# Patient Record
Sex: MALE | Race: ASIAN | Employment: FULL TIME | URBAN - METROPOLITAN AREA
[De-identification: names, ages, dates, MRNs, and addresses within clinical notes are randomized per-mention and may not be internally consistent; named-entity substitution may affect disease eponyms.]

---

## 2017-01-06 ENCOUNTER — OFFICE VISIT (OUTPATIENT)
Dept: FAMILY MEDICINE CLINIC | Facility: CLINIC | Age: 42
End: 2017-01-06

## 2017-01-06 VITALS
OXYGEN SATURATION: 98 % | BODY MASS INDEX: 23.08 KG/M2 | SYSTOLIC BLOOD PRESSURE: 122 MMHG | DIASTOLIC BLOOD PRESSURE: 80 MMHG | HEART RATE: 51 BPM | TEMPERATURE: 97 F | WEIGHT: 143.63 LBS | HEIGHT: 66 IN | RESPIRATION RATE: 16 BRPM

## 2017-01-06 DIAGNOSIS — H92.01 OTALGIA, RIGHT: ICD-10-CM

## 2017-01-06 DIAGNOSIS — J06.9 VIRAL UPPER RESPIRATORY TRACT INFECTION: Primary | ICD-10-CM

## 2017-01-06 PROCEDURE — 99202 OFFICE O/P NEW SF 15 MIN: CPT | Performed by: PHYSICIAN ASSISTANT

## 2017-01-06 NOTE — PATIENT INSTRUCTIONS
Viral Respiratory Illness [Adult]  You have an Upper Respiratory Illness (URI) caused by a virus. This illness is contagious during the first few days.  It is spread through the air by coughing and sneezing or by direct contact (touching the sick person a © 4090-5167 The [address] [place] [address], [address] [place] [employer]. All rights reserved. This information is not intended as a substitute for professional medical care. Always follow your healthcare professional's instructions.           Fluid · You may use acetaminophen (Tylenol) or ibuprofen (Motrin, Advil) to control pain, unless another medicine was prescribed.  [NOTE: If you have chronic liver or kidney disease or ever had a stomach ulcer or GI bleeding, talk with your doctor before using th

## 2017-01-06 NOTE — PROGRESS NOTES
CHIEF COMPLAINT:   Patient presents with:  Cold: nasal and ear congestion sx x 1 day. HPI:      Leif Licea is a 35year old male who presents for upper respiratory symptoms for 2 days. Patient reports congestion, right ear presssure. .  Denies tin edema      No results found for this or any previous visit (from the past 24 hour(s)).       ASSESSMENT AND PLAN:   Louie Razo is a 35year old male who presents with upper respiratory symptoms that are consistent with    ASSESSMENT:   Viral upper respi

## 2018-12-13 PROCEDURE — 86480 TB TEST CELL IMMUN MEASURE: CPT | Performed by: INTERNAL MEDICINE

## 2019-01-25 PROCEDURE — 87086 URINE CULTURE/COLONY COUNT: CPT | Performed by: FAMILY MEDICINE

## 2020-01-28 PROBLEM — E55.9 VITAMIN D DEFICIENCY: Status: ACTIVE | Noted: 2020-01-28

## 2020-03-02 ENCOUNTER — OFFICE VISIT (OUTPATIENT)
Dept: FAMILY MEDICINE CLINIC | Facility: CLINIC | Age: 45
End: 2020-03-02
Payer: COMMERCIAL

## 2020-03-02 VITALS
HEIGHT: 64.96 IN | WEIGHT: 143 LBS | OXYGEN SATURATION: 98 % | BODY MASS INDEX: 23.82 KG/M2 | DIASTOLIC BLOOD PRESSURE: 76 MMHG | TEMPERATURE: 99 F | SYSTOLIC BLOOD PRESSURE: 128 MMHG | HEART RATE: 78 BPM | RESPIRATION RATE: 16 BRPM

## 2020-03-02 DIAGNOSIS — R68.89 FLU-LIKE SYMPTOMS: Primary | ICD-10-CM

## 2020-03-02 LAB
POCT INFLUENZA A: NEGATIVE
POCT INFLUENZA B: NEGATIVE

## 2020-03-02 PROCEDURE — 87502 INFLUENZA DNA AMP PROBE: CPT | Performed by: NURSE PRACTITIONER

## 2020-03-02 PROCEDURE — 99202 OFFICE O/P NEW SF 15 MIN: CPT | Performed by: NURSE PRACTITIONER

## 2020-03-02 NOTE — PROGRESS NOTES
CHIEF COMPLAINT:   Patient presents with:  URI: cough,congestion,fever sx started last night. earache sx x 5 days. HPI:   Daniel Tran is a 53year old male who presents for upper respiratory symptoms for  2 days.  Patient reports sore throat only a exudates. Tonsils 2/4. NECK: Supple, non-tender  LUNGS: clear to auscultation bilaterally, no wheezes or rhonchi. Breathing is non labored.   CARDIO: RRR without murmur  EXTREMITIES: no cyanosis, clubbing or edema  LYMPH:  Pos anterior cervical lymphaden

## 2020-03-14 PROBLEM — R73.09 ELEVATED GLUCOSE: Status: ACTIVE | Noted: 2020-03-14

## 2022-07-29 ENCOUNTER — HOSPITAL ENCOUNTER (OUTPATIENT)
Facility: HOSPITAL | Age: 47
Setting detail: HOSPITAL OUTPATIENT SURGERY
Discharge: HOME | End: 2022-07-29
Attending: INTERNAL MEDICINE | Admitting: INTERNAL MEDICINE
Payer: COMMERCIAL

## 2022-07-29 VITALS
HEIGHT: 66 IN | OXYGEN SATURATION: 100 % | TEMPERATURE: 98 F | BODY MASS INDEX: 22.5 KG/M2 | WEIGHT: 140 LBS | DIASTOLIC BLOOD PRESSURE: 66 MMHG | RESPIRATION RATE: 16 BRPM | HEART RATE: 82 BPM | SYSTOLIC BLOOD PRESSURE: 127 MMHG

## 2022-07-29 PROCEDURE — 99152 MOD SED SAME PHYS/QHP 5/>YRS: CPT | Performed by: INTERNAL MEDICINE

## 2022-07-29 PROCEDURE — 88305 TISSUE EXAM BY PATHOLOGIST: CPT | Performed by: INTERNAL MEDICINE

## 2022-07-29 PROCEDURE — 0DBL8ZX EXCISION OF TRANSVERSE COLON, VIA NATURAL OR ARTIFICIAL OPENING ENDOSCOPIC, DIAGNOSTIC: ICD-10-PCS | Performed by: INTERNAL MEDICINE

## 2022-07-29 RX ORDER — SODIUM CHLORIDE, SODIUM LACTATE, POTASSIUM CHLORIDE, CALCIUM CHLORIDE 600; 310; 30; 20 MG/100ML; MG/100ML; MG/100ML; MG/100ML
INJECTION, SOLUTION INTRAVENOUS CONTINUOUS
Status: DISCONTINUED | OUTPATIENT
Start: 2022-07-29 | End: 2022-07-29

## 2022-07-29 RX ORDER — MIDAZOLAM HYDROCHLORIDE 1 MG/ML
INJECTION INTRAMUSCULAR; INTRAVENOUS
Status: DISCONTINUED | OUTPATIENT
Start: 2022-07-29 | End: 2022-07-29

## 2022-07-29 NOTE — PRE-SEDATION ASSESSMENT
Physician Pre-Sedation Assessment    Pre-Sedation Assessment:    Sedation History: No Previous Sedaton Recieved and Airway Assessed    Cardiac: normal S1, S2  Respiratory: breath sounds clear bilaterally   Abdomen: soft, BS (+), non-tender    ASA Classification: 1.  Normal healthy patient    Plan: IV Sedation

## 2022-07-29 NOTE — OPERATIVE REPORT
[facility] Patient Status:  Hospital Outpatient Surgery    1975 MRN [id number]   Location [id number] [place] [address] Attending Lucy Wesley MD   Hosp Day # 0 PCP Maggi Feldman MD         PATIENT NAME: Cameron Navarro  DATE OF OPERATION: 2022    PREOPERATIVE DIAGNOSIS:  1. CRC screening  POSTOPERATIVE DIAGNOSIS:  1. Colon polyp, removed    PROCEDURE PERFORMED: Colonoscopy with conscious sedation  SURGEON: Isak Arizmenid MD   MEDICATIONS: Fentanyl 100 mcg IV and Versed 5 mg IV in divided doses under the supervision of Dr. Anthony Arizmendi. PROCEDURE AND FINDINGS: The patient was placed into the left lateral decubitus position after informed consent was obtained. All questions were answered. An ASA score was assigned, Mallampati score 1. IV sedation was administered. A rectal exam was performed which was normal.  The Olympus video colonoscope was then introduced through the rectum and advanced through the colon to the cecum. The quality of prep was excellent, adequate, Aronchick 1. The cecum was identified by the ileocecal valve and appendiceal orifice. The colonoscope was then withdrawn and the mucosa was further carefully inspected. There was a 5 mm polyp in the transverse colon that was completely removed with a cold forceps and retrieved. The remainder of the entire examined colon was otherwise normal.  After retroflexion in the rectum, the colonoscope was straightened and removed and the procedure was completed. The patient tolerated the procedure well. There were no implants placed nor significant blood loss. There were no immediate apparent complications. Total moderate sedation time was 18 minutes. A trained sedation nurse was present to assist in monitoring the patient during the entire length of the moderate sedation time. RECOMMENDATIONS   Follow up on pathology. Repeat colonoscopy in 7 years if the polyp(s) is/are adenomatous.   Repeat in 10 years if the polyp(s) is/are hyperplastic. Consume a high fiber diet. Emiliano Jha MD

## (undated) DEVICE — 3M™ RED DOT™ MONITORING ELECTRODE WITH FOAM TAPE AND STICKY GEL, 50/BAG, 20/CASE, 72/PLT 2570: Brand: RED DOT™

## (undated) DEVICE — Device: Brand: DEFENDO AIR/WATER/SUCTION AND BIOPSY VALVE

## (undated) DEVICE — 1200CC GUARDIAN II: Brand: GUARDIAN

## (undated) DEVICE — FILTERLINE NASAL ADULT O2/CO2

## (undated) DEVICE — ENDOSCOPY PACK - LOWER: Brand: MEDLINE INDUSTRIES, INC.

## (undated) DEVICE — FORCEP BIOPSY RJ4 LG CAP W/ND

## (undated) NOTE — MR AVS SNAPSHOT
[facility]  [address] [facility]  [place] [address]  [phone]               Thank you for choosing us for your health care visit with NILS Cunningham. We are glad to serve you and happy to provide you with this summary of your visit.   Please h you’re sick, but they may be helpful for the following symptoms: cough (Robitussin DM); sore throat (Chloraseptic lozenges or spray); nasal and sinus congestion (Actifed, Sudafed, Chlortrimeton).   Follow Up  with your doctor or as advised if you don’t impr If there has been no improvement after three months, surgery may be used to drain the fluid and insert a small tube in the eardrum to permit continued drainage.   Because the middle ear fluid can become infected, it is important to watch for signs of an ear This list is accurate as of: 1/6/17  9:36 AM.  Always use your most recent med list.                MULTIPLE VITAMIN OR   Take  by mouth. omega-3 fatty acids 1000 MG Caps   Take  by mouth.    Commonly known as:  FISH OIL                   Follow-u